# Patient Record
Sex: FEMALE | Race: BLACK OR AFRICAN AMERICAN | URBAN - METROPOLITAN AREA
[De-identification: names, ages, dates, MRNs, and addresses within clinical notes are randomized per-mention and may not be internally consistent; named-entity substitution may affect disease eponyms.]

---

## 2021-11-15 ENCOUNTER — OFFICE VISIT (OUTPATIENT)
Dept: URBAN - METROPOLITAN AREA CLINIC 118 | Facility: CLINIC | Age: 56
End: 2021-11-15
Payer: COMMERCIAL

## 2021-11-15 ENCOUNTER — DASHBOARD ENCOUNTERS (OUTPATIENT)
Age: 56
End: 2021-11-15

## 2021-11-15 ENCOUNTER — LAB OUTSIDE AN ENCOUNTER (OUTPATIENT)
Dept: URBAN - METROPOLITAN AREA CLINIC 118 | Facility: CLINIC | Age: 56
End: 2021-11-15

## 2021-11-15 DIAGNOSIS — K62.89 RECTAL PAIN: ICD-10-CM

## 2021-11-15 DIAGNOSIS — M53.3 COCCYDYNIA: ICD-10-CM

## 2021-11-15 DIAGNOSIS — K62.5 RECTAL BLEEDING: ICD-10-CM

## 2021-11-15 DIAGNOSIS — K59.04 CHRONIC IDIOPATHIC CONSTIPATION: ICD-10-CM

## 2021-11-15 DIAGNOSIS — Z86.010 HISTORY OF COLON POLYPS: ICD-10-CM

## 2021-11-15 PROBLEM — 428283002: Status: ACTIVE | Noted: 2021-11-15

## 2021-11-15 PROBLEM — 82934008: Status: ACTIVE | Noted: 2021-11-15

## 2021-11-15 PROCEDURE — 99204 OFFICE O/P NEW MOD 45 MIN: CPT | Performed by: INTERNAL MEDICINE

## 2021-11-15 RX ORDER — LINACLOTIDE 145 UG/1
1 CAPSULE AT LEAST 30 MINUTES BEFORE THE FIRST MEAL OF THE DAY ON AN EMPTY STOMACH CAPSULE, GELATIN COATED ORAL ONCE A DAY
Qty: 90 | Refills: 2 | OUTPATIENT
Start: 2021-11-15 | End: 2022-08-12

## 2021-11-15 RX ORDER — DEXAMETHASONE 4 MG/1
TABLET ORAL
Qty: 12 | Status: ACTIVE | COMMUNITY

## 2021-11-15 RX ORDER — HYDROCORTISONE ACETATE 25 MG/1
SUPPOSITORY RECTAL
Qty: 20 | Refills: 0 | Status: ACTIVE | COMMUNITY

## 2021-11-15 NOTE — PHYSICAL EXAM GASTROINTESTINAL
Abdomen , soft, nontender, nondistended ,  Rectal exam MA Banks present as a chaperone External exam: moderate nonthrombosed external hemorrhoids Tenderness to touch on the coccyx region  Internal exam: moderate internal hemorrhoids. no blood or rectal mass noted.  Normal anal sphincter tone

## 2021-11-15 NOTE — HPI-TODAY'S VISIT:
This is a 55 yo female with pmh of chronic constipation and hemorrhoids here for evaluation for rectal pain and bleeding.  Reports having chronic hemorrhoids for years with intermittent rectal bleeding. For the past 3 months or so, she has had pain in the lower back around tailbone down to her rectum. Pain is worse with sitting on her buttock. Has to sit on her sides.  She has chronic constipation. Tried multiple OTC meds including fiber, miralax, and enemas.  Last colonoscopy 7 years ago.

## 2025-01-03 ENCOUNTER — LAB OUTSIDE AN ENCOUNTER (OUTPATIENT)
Dept: URBAN - METROPOLITAN AREA SURGERY CENTER 23 | Facility: SURGERY CENTER | Age: 60
End: 2025-01-03

## 2025-01-07 ENCOUNTER — CLAIMS CREATED FROM THE CLAIM WINDOW (OUTPATIENT)
Dept: URBAN - METROPOLITAN AREA SURGERY CENTER 23 | Facility: SURGERY CENTER | Age: 60
End: 2025-01-07
Payer: COMMERCIAL

## 2025-01-07 ENCOUNTER — CLAIMS CREATED FROM THE CLAIM WINDOW (OUTPATIENT)
Dept: URBAN - METROPOLITAN AREA CLINIC 4 | Facility: CLINIC | Age: 60
End: 2025-01-07
Payer: COMMERCIAL

## 2025-01-07 DIAGNOSIS — K63.5 HYPERPLASTIC POLYP OF ASCENDING COLON: ICD-10-CM

## 2025-01-07 DIAGNOSIS — K63.5 BENIGN COLON POLYP: ICD-10-CM

## 2025-01-07 DIAGNOSIS — Z12.11 COLON CANCER SCREENING: ICD-10-CM

## 2025-01-07 DIAGNOSIS — K64.2 THIRD DEGREE HEMORRHOIDS: ICD-10-CM

## 2025-01-07 DIAGNOSIS — K60.2 ANAL FISSURE: ICD-10-CM

## 2025-01-07 DIAGNOSIS — K63.5 POLYP OF COLON: ICD-10-CM

## 2025-01-07 DIAGNOSIS — K63.89 TORTUOUS COLON: ICD-10-CM

## 2025-01-07 PROCEDURE — 88305 TISSUE EXAM BY PATHOLOGIST: CPT | Performed by: PATHOLOGY

## 2025-01-07 PROCEDURE — 45385 COLONOSCOPY W/LESION REMOVAL: CPT | Performed by: INTERNAL MEDICINE

## 2025-01-07 PROCEDURE — 00811 ANES LWR INTST NDSC NOS: CPT | Performed by: NURSE ANESTHETIST, CERTIFIED REGISTERED

## 2025-01-07 RX ORDER — HYDROCORTISONE ACETATE 25 MG/1
SUPPOSITORY RECTAL
Qty: 20 | Refills: 0 | Status: ACTIVE | COMMUNITY

## 2025-01-07 RX ORDER — DEXAMETHASONE 4 MG/1
TABLET ORAL
Qty: 12 | Status: ACTIVE | COMMUNITY